# Patient Record
Sex: MALE | Race: WHITE | NOT HISPANIC OR LATINO | URBAN - METROPOLITAN AREA
[De-identification: names, ages, dates, MRNs, and addresses within clinical notes are randomized per-mention and may not be internally consistent; named-entity substitution may affect disease eponyms.]

---

## 2024-12-24 ENCOUNTER — APPOINTMENT (OUTPATIENT)
Dept: PRIMARY CARE | Facility: CLINIC | Age: 24
End: 2024-12-24

## 2025-01-07 ENCOUNTER — APPOINTMENT (OUTPATIENT)
Dept: PRIMARY CARE | Facility: CLINIC | Age: 25
End: 2025-01-07

## 2025-03-24 ENCOUNTER — APPOINTMENT (OUTPATIENT)
Dept: PRIMARY CARE | Facility: CLINIC | Age: 25
End: 2025-03-24
Payer: COMMERCIAL

## 2025-03-24 VITALS
HEIGHT: 68 IN | SYSTOLIC BLOOD PRESSURE: 126 MMHG | HEART RATE: 97 BPM | TEMPERATURE: 97.6 F | WEIGHT: 156 LBS | OXYGEN SATURATION: 99 % | BODY MASS INDEX: 23.64 KG/M2 | DIASTOLIC BLOOD PRESSURE: 72 MMHG

## 2025-03-24 DIAGNOSIS — J30.9 ALLERGIC RHINITIS, UNSPECIFIED SEASONALITY, UNSPECIFIED TRIGGER: Primary | ICD-10-CM

## 2025-03-24 DIAGNOSIS — J45.20 MILD INTERMITTENT ASTHMA, UNSPECIFIED WHETHER COMPLICATED (HHS-HCC): ICD-10-CM

## 2025-03-24 PROCEDURE — 99214 OFFICE O/P EST MOD 30 MIN: CPT | Performed by: PHYSICIAN ASSISTANT

## 2025-03-24 PROCEDURE — 1036F TOBACCO NON-USER: CPT | Performed by: PHYSICIAN ASSISTANT

## 2025-03-24 PROCEDURE — 3008F BODY MASS INDEX DOCD: CPT | Performed by: PHYSICIAN ASSISTANT

## 2025-03-24 RX ORDER — ALBUTEROL SULFATE 90 UG/1
2 INHALANT RESPIRATORY (INHALATION) EVERY 4 HOURS PRN
Qty: 18 G | Refills: 1 | Status: SHIPPED | OUTPATIENT
Start: 2025-03-24 | End: 2026-03-24

## 2025-03-24 RX ORDER — SODIUM FLUORIDE 5 MG/ML
PASTE, DENTIFRICE DENTAL
COMMUNITY
Start: 2025-01-10

## 2025-03-24 ASSESSMENT — ENCOUNTER SYMPTOMS
CHILLS: 0
FEVER: 0

## 2025-03-24 NOTE — PROGRESS NOTES
"Subjective   Patient ID: Robert Harris is a 25 y.o. male who presents for Cough (Discuss constant cough and drainage, asthma flares X 4 years).    HPI   Patient complains of flare up of his allergies and asthma the past few years. Hasn't needed to use inhaler in several years, but has noticed when he is around a lot of dusts he gets coughing and gets rhinitis and PND from it.  Sometimes it gets him a little SOB. Also notices when he is around cats this triggers his symptoms.  Requests Rx for albuterol.   Hx of allergies and mild asthma since he was a kid.  Completed allergy shots with allergist a few years ago.       Hasn't been here since 7/2022.     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      Past Medical History:   Diagnosis Date    Allergic rhinitis     Mild intermittent asthma       Family History   Problem Relation Name Age of Onset    Asthma Mother      Diabetes Mother        Social History     Tobacco Use    Smoking status: Never    Smokeless tobacco: Never   Vaping Use    Vaping status: Never Used   Substance Use Topics    Alcohol use: Never    Drug use: Never        Review of Systems   Constitutional:  Negative for chills and fever.   Cardiovascular:  Negative for chest pain.       Objective   /72   Pulse 97   Temp 36.4 °C (97.6 °F)   Ht 1.727 m (5' 8\")   Wt 70.8 kg (156 lb)   SpO2 99%   BMI 23.72 kg/m²     Physical Exam  Vitals and nursing note reviewed.   HENT:      Head: Normocephalic.      Right Ear: Tympanic membrane, ear canal and external ear normal.      Left Ear: Tympanic membrane, ear canal and external ear normal.      Nose: Nose normal.      Mouth/Throat:      Mouth: Mucous membranes are moist.      Pharynx: Oropharynx is clear.   Eyes:      General: No scleral icterus.  Cardiovascular:      Rate and Rhythm: Normal rate and regular rhythm.   Pulmonary:      Effort: Pulmonary effort is normal.      Breath sounds: Normal breath " sounds.   Abdominal:      Palpations: Abdomen is soft. There is no mass.      Tenderness: There is no abdominal tenderness.   Skin:     General: Skin is warm and dry.   Neurological:      Mental Status: He is alert.   Psychiatric:         Mood and Affect: Affect normal.         Assessment/Plan   Diagnoses and all orders for this visit:  Allergic rhinitis, unspecified seasonality, unspecified trigger  -     Follow Up In Primary Care - Established; Future  Mild intermittent asthma, unspecified whether complicated ([facility])  -     albuterol 90 mcg/actuation inhaler; Inhale 2 puffs every 4 hours if needed for wheezing or shortness of breath.  -     Follow Up In Primary Care - Established; Future       Start OTC Zyrtec 10mg every day.   Add flonase if needed.   Rx Albuterol to use as rescue inhaler.   Recheck allergies and asthma in 3 months. If needing to use albuterol a few times per week (or more) will consider adding maintenance steroid inhaler.

## 2025-06-24 ENCOUNTER — APPOINTMENT (OUTPATIENT)
Dept: PRIMARY CARE | Facility: CLINIC | Age: 25
End: 2025-06-24
Payer: COMMERCIAL